# Patient Record
Sex: MALE | Race: BLACK OR AFRICAN AMERICAN | Employment: UNEMPLOYED | URBAN - METROPOLITAN AREA
[De-identification: names, ages, dates, MRNs, and addresses within clinical notes are randomized per-mention and may not be internally consistent; named-entity substitution may affect disease eponyms.]

---

## 2017-06-21 ENCOUNTER — APPOINTMENT (OUTPATIENT)
Dept: GENERAL RADIOLOGY | Age: 62
End: 2017-06-21
Attending: EMERGENCY MEDICINE
Payer: MEDICARE

## 2017-06-21 ENCOUNTER — HOSPITAL ENCOUNTER (EMERGENCY)
Age: 62
Discharge: HOME | End: 2017-06-21
Attending: EMERGENCY MEDICINE
Payer: MEDICARE

## 2017-06-21 VITALS
SYSTOLIC BLOOD PRESSURE: 165 MMHG | OXYGEN SATURATION: 98 % | HEART RATE: 78 BPM | HEIGHT: 77 IN | WEIGHT: 173.28 LBS | DIASTOLIC BLOOD PRESSURE: 89 MMHG | TEMPERATURE: 98.3 F | RESPIRATION RATE: 15 BRPM | BODY MASS INDEX: 20.46 KG/M2

## 2017-06-21 DIAGNOSIS — R07.89 ATYPICAL CHEST PAIN: Primary | ICD-10-CM

## 2017-06-21 LAB
ALBUMIN SERPL BCP-MCNC: 3.5 G/DL (ref 3.5–5)
ALBUMIN/GLOB SERPL: 0.8 {RATIO} (ref 1.1–2.2)
ALP SERPL-CCNC: 104 U/L (ref 45–117)
ALT SERPL-CCNC: 13 U/L (ref 12–78)
ANION GAP BLD CALC-SCNC: 9 MMOL/L (ref 5–15)
AST SERPL W P-5'-P-CCNC: 18 U/L (ref 15–37)
ATRIAL RATE: 66 BPM
BASOPHILS # BLD AUTO: 0.1 K/UL (ref 0–0.1)
BASOPHILS # BLD: 1 % (ref 0–1)
BILIRUB SERPL-MCNC: 0.5 MG/DL (ref 0.2–1)
BUN SERPL-MCNC: 32 MG/DL (ref 6–20)
BUN/CREAT SERPL: 4 (ref 12–20)
CALCIUM SERPL-MCNC: 8.9 MG/DL (ref 8.5–10.1)
CALCULATED P AXIS, ECG09: 74 DEGREES
CALCULATED R AXIS, ECG10: 57 DEGREES
CALCULATED T AXIS, ECG11: 80 DEGREES
CHLORIDE SERPL-SCNC: 99 MMOL/L (ref 97–108)
CO2 SERPL-SCNC: 29 MMOL/L (ref 21–32)
CREAT SERPL-MCNC: 8.23 MG/DL (ref 0.7–1.3)
D DIMER PPP FEU-MCNC: 0.37 MG/L FEU (ref 0–0.65)
DIAGNOSIS, 93000: NORMAL
EOSINOPHIL # BLD: 0.1 K/UL (ref 0–0.4)
EOSINOPHIL NFR BLD: 2 % (ref 0–7)
ERYTHROCYTE [DISTWIDTH] IN BLOOD BY AUTOMATED COUNT: 18.1 % (ref 11.5–14.5)
GLOBULIN SER CALC-MCNC: 4.3 G/DL (ref 2–4)
GLUCOSE SERPL-MCNC: 76 MG/DL (ref 65–100)
HCT VFR BLD AUTO: 37.3 % (ref 36.6–50.3)
HGB BLD-MCNC: 11.5 G/DL (ref 12.1–17)
LYMPHOCYTES # BLD AUTO: 26 % (ref 12–49)
LYMPHOCYTES # BLD: 1.2 K/UL (ref 0.8–3.5)
MCH RBC QN AUTO: 28 PG (ref 26–34)
MCHC RBC AUTO-ENTMCNC: 30.8 G/DL (ref 30–36.5)
MCV RBC AUTO: 90.8 FL (ref 80–99)
MONOCYTES # BLD: 0.6 K/UL (ref 0–1)
MONOCYTES NFR BLD AUTO: 13 % (ref 5–13)
NEUTS SEG # BLD: 2.7 K/UL (ref 1.8–8)
NEUTS SEG NFR BLD AUTO: 58 % (ref 32–75)
P-R INTERVAL, ECG05: 154 MS
PLATELET # BLD AUTO: 190 K/UL (ref 150–400)
POTASSIUM SERPL-SCNC: 2.9 MMOL/L (ref 3.5–5.1)
PROT SERPL-MCNC: 7.8 G/DL (ref 6.4–8.2)
Q-T INTERVAL, ECG07: 420 MS
QRS DURATION, ECG06: 100 MS
QTC CALCULATION (BEZET), ECG08: 440 MS
RBC # BLD AUTO: 4.11 M/UL (ref 4.1–5.7)
SODIUM SERPL-SCNC: 137 MMOL/L (ref 136–145)
TROPONIN I SERPL-MCNC: <0.04 NG/ML
VENTRICULAR RATE, ECG03: 66 BPM
WBC # BLD AUTO: 4.7 K/UL (ref 4.1–11.1)

## 2017-06-21 PROCEDURE — 99283 EMERGENCY DEPT VISIT LOW MDM: CPT

## 2017-06-21 PROCEDURE — 84484 ASSAY OF TROPONIN QUANT: CPT | Performed by: EMERGENCY MEDICINE

## 2017-06-21 PROCEDURE — 93005 ELECTROCARDIOGRAM TRACING: CPT

## 2017-06-21 PROCEDURE — 80053 COMPREHEN METABOLIC PANEL: CPT | Performed by: EMERGENCY MEDICINE

## 2017-06-21 PROCEDURE — 36415 COLL VENOUS BLD VENIPUNCTURE: CPT | Performed by: EMERGENCY MEDICINE

## 2017-06-21 PROCEDURE — 85025 COMPLETE CBC W/AUTO DIFF WBC: CPT | Performed by: EMERGENCY MEDICINE

## 2017-06-21 PROCEDURE — 71010 XR CHEST PORT: CPT

## 2017-06-21 PROCEDURE — 85379 FIBRIN DEGRADATION QUANT: CPT | Performed by: EMERGENCY MEDICINE

## 2017-06-21 NOTE — DISCHARGE INSTRUCTIONS
You were seen here in the emergency department for a week-long concern for chest pain. Your evaluation here including an exam, blood work, ekg and chest xray were reassuring for any acute emergency. As we discussed, it is strongly recommended that you follow-up with a cardiologist for re-evaluation. Return for new chest pain, difficulty breathing, if you develop sudden onset weakness in your face, arms or legs. Chest Pain: Care Instructions  Your Care Instructions  There are many things that can cause chest pain. Some are not serious and will get better on their own in a few days. But some kinds of chest pain need more testing and treatment. Your doctor may have recommended a follow-up visit in the next 8 to 12 hours. If you are not getting better, you may need more tests or treatment. Even though your doctor has released you, you still need to watch for any problems. The doctor carefully checked you, but sometimes problems can develop later. If you have new symptoms or if your symptoms do not get better, get medical care right away. If you have worse or different chest pain or pressure that lasts more than 5 minutes or you passed out (lost consciousness), call 911 or seek other emergency help right away. A medical visit is only one step in your treatment. Even if you feel better, you still need to do what your doctor recommends, such as going to all suggested follow-up appointments and taking medicines exactly as directed. This will help you recover and help prevent future problems. How can you care for yourself at home? · Rest until you feel better. · Take your medicine exactly as prescribed. Call your doctor if you think you are having a problem with your medicine. · Do not drive after taking a prescription pain medicine. When should you call for help? Call 911 if:  · You passed out (lost consciousness). · You have severe difficulty breathing. · You have symptoms of a heart attack.  These may include:  ¨ Chest pain or pressure, or a strange feeling in your chest.  ¨ Sweating. ¨ Shortness of breath. ¨ Nausea or vomiting. ¨ Pain, pressure, or a strange feeling in your back, neck, jaw, or upper belly or in one or both shoulders or arms. ¨ Lightheadedness or sudden weakness. ¨ A fast or irregular heartbeat. After you call 911, the  may tell you to chew 1 adult-strength or 2 to 4 low-dose aspirin. Wait for an ambulance. Do not try to drive yourself. Call your doctor today if:  · You have any trouble breathing. · Your chest pain gets worse. · You are dizzy or lightheaded, or you feel like you may faint. · You are not getting better as expected. · You are having new or different chest pain. Where can you learn more? Go to http://candace-celestina.info/. Enter A120 in the search box to learn more about \"Chest Pain: Care Instructions. \"  Current as of: March 20, 2017  Content Version: 11.3  © 2957-6378 [employer]. Care instructions adapted under license by [employer] (which disclaims liability or warranty for this information). If you have questions about a medical condition or this instruction, always ask your healthcare professional. [place] [address] any warranty or liability for your use of this information.

## 2017-06-21 NOTE — ED PROVIDER NOTES
HPI Comments: Evonnie Osler, 57 y.o. Male with h/o HTN and on home hemodialysis, presents ambulatory to [facility] ED with cc of 1 week of constant, non-radiating and non-exertional chest pain. He states his chest pain worsened today and currently rates his pain as 7/10 in severity, described as tightness, and with an associated pleuritic component. He also c/o slight shortness of breath, decreased appetite, and breaking into a cold sweat this morning. He reports no known alleviating factors of his sxs. Patient specifically denies nausea, leg swelling, calf pain, travel, fever, night sweats, weight loss, or hemoptysis. His pmhx is not significant for CAD, MI, HF, HLD, DM, DVT/PE. His fhx is also not significant for DVT/PE or MI. Patient states he receives yearly stress tests at [facility] and notes that his last stress test was not completed. PCP: Sera Davila MD    PMHx significant for: HTN, ESRF on dialysis, HLD   PSHx significant for: L AV fistula, R kidney transplant and nephrectomy   Social history significant for: - Tobacco (former), - EtOH, + Illicit Drug Use (OTC/prescription)    There are no other complaints, changes, or physical findings at this time. Written by VEENA Florenceibofelia, as dictated by Bailey Capone MD.     The history is provided by the patient. No  was used. Past Medical History:   Diagnosis Date    Chronic kidney disease     dialysis    End stage renal failure on dialysis ([address]     on home dialysis    Hypercholesterolemia     Hypertension        Past Surgical History:   Procedure Laterality Date    HX TRANSPLANT      right kidney transplant then removal     HX VASCULAR ACCESS      left forearm shunt    HX VASCULAR ACCESS  4/22/14    REVISION AV FISTULA LEFT ARM WITH REPAIR OF PSEUDOANEURYSM         No family history on file.     Social History     Social History    Marital status:      Spouse name: N/A    Number of children: N/A    Years of education: N/A     Occupational History    Not on file. Social History Main Topics    Smoking status: Never Smoker    Smokeless tobacco: Not on file    Alcohol use No    Drug use: Yes     Special: Prescription, OTC    Sexual activity: Not on file     Other Topics Concern    Not on file     Social History Narrative    No narrative on file         ALLERGIES: Codeine; Metoprolol; Morphine; and Pcn [penicillins]    Review of Systems   Constitutional: Positive for appetite change and diaphoresis (cold sweat). Negative for fever and unexpected weight change. Negative for night sweats   Respiratory: Positive for shortness of breath. Negative for hemoptysis    Cardiovascular: Positive for chest pain. Negative for leg swelling. Gastrointestinal: Negative for nausea. Musculoskeletal: Negative for myalgias. All other systems reviewed and are negative. Vitals:    06/21/17 1312 06/21/17 1553 06/21/17 1638   BP: 148/84 116/67 165/89   Pulse: 71 72 78   Resp: 16 16 15   Temp: 97.6 °F (36.4 °C)  98.3 °F (36.8 °C)   SpO2: 100% 100% 98%   Weight: 78.6 kg (173 lb 4.5 oz)     Height: 6' 5\" (1.956 m)              Physical Exam   Vital signs and nursing notes reviewed    CONSTITUTIONAL: Alert, in no apparent distress; well-developed; well-nourished. Tall, thin build. HEAD:  Normocephalic, atraumatic  EYES: PERRL; EOM's intact. ENTM: Nose: no rhinorrhea; Throat: no erythema or exudate, mucous membranes moist  Neck:  Supple. trachea is midline. RESP: Chest clear, equal breath sounds. - W/R/R  CV: S1 and S2 WNL; No murmurs, gallops or rubs. 2+ radial and DP pulses bilaterally. GI: non-distended, normal bowel sounds, abdomen soft and non-tender. No masses or organomegaly. : No costo-vertebral angle tenderness. BACK:  Non-tender, normal appearance  UPPER EXT:  Normal inspection. no joint or soft tissue swelling  LOWER EXT: No edema, no calf tenderness.   NEURO: Alert and oriented x3, 5/5 strength and light touch sensation intact in bilateral upper and lower extremities. SKIN: No rashes; Warm and dry  PSYCH: Normal mood, normal affect  Written by VEENA Dang, as dictated by Venita Qiu MD.      MDM  Number of Diagnoses or Management Options  Atypical chest pain:   Diagnosis management comments:   57 M with week-long chest pain with some pleuritic component w/o evidence of external trauma. Initial ekg reassuring for ischemia. Will screen with one Tn, dash. Ekg does not suggest pericarditis. XR does not show PTX or widened mediastinum. If labs reassuring, will d/c with outpatient cardiology f/u. will provide local cardiologist referral though [place] [facility] [place] Amount and/or Complexity of Data Reviewed  Clinical lab tests: ordered and reviewed  Tests in the radiology section of CPT®: reviewed and ordered  Tests in the medicine section of CPT®: reviewed and ordered  Review and summarize past medical records: yes  Independent visualization of images, tracings, or specimens: yes    Patient Progress  Patient progress: stable    ED Course       Procedures    EKG interpretation: (Preliminary) 13:14  SR at 66 BPM. nml axis. nml intervals. No acute ischemic changes. Written by VEENA Dang, as dictated by Venita Qiu MD.     Progress Note:  3:25 PM  Spoke with lab, states pt's specimens for CMP and Tn have hemolyzed. RN states phlebotomist is in the pt's room currently re-drawing specimens.   Written by VEENA Dang, as dictated by Venita Qiu MD.        LABORATORY TESTS:  Recent Results (from the past 12 hour(s))   EKG, 12 LEAD, INITIAL    Collection Time: 06/21/17  1:14 PM   Result Value Ref Range    Ventricular Rate 66 BPM    Atrial Rate 66 BPM    P-R Interval 154 ms    QRS Duration 100 ms    Q-T Interval 420 ms    QTC Calculation (Bezet) 440 ms    Calculated P Axis 74 degrees    Calculated R Axis 57 degrees    Calculated T Axis 80 degrees Diagnosis       Normal sinus rhythm  Normal ECG  When compared with ECG of 22-APR-2014 09:33,  Criteria for Anterior infarct are no longer present     CBC WITH AUTOMATED DIFF    Collection Time: 06/21/17  1:44 PM   Result Value Ref Range    WBC 4.7 4.1 - 11.1 K/uL    RBC 4.11 4.10 - 5.70 M/uL    HGB 11.5 (L) 12.1 - 17.0 g/dL    HCT 37.3 36.6 - 50.3 %    MCV 90.8 80.0 - 99.0 FL    MCH 28.0 26.0 - 34.0 PG    MCHC 30.8 30.0 - 36.5 g/dL    RDW 18.1 (H) 11.5 - 14.5 %    PLATELET 843 453 - 513 K/uL    NEUTROPHILS 58 32 - 75 %    LYMPHOCYTES 26 12 - 49 %    MONOCYTES 13 5 - 13 %    EOSINOPHILS 2 0 - 7 %    BASOPHILS 1 0 - 1 %    ABS. NEUTROPHILS 2.7 1.8 - 8.0 K/UL    ABS. LYMPHOCYTES 1.2 0.8 - 3.5 K/UL    ABS. MONOCYTES 0.6 0.0 - 1.0 K/UL    ABS. EOSINOPHILS 0.1 0.0 - 0.4 K/UL    ABS. BASOPHILS 0.1 0.0 - 0.1 K/UL   D DIMER    Collection Time: 06/21/17  1:44 PM   Result Value Ref Range    D-dimer 0.37 0.00 - 0.65 mg/L [facility]   METABOLIC PANEL, COMPREHENSIVE    Collection Time: 06/21/17  3:22 PM   Result Value Ref Range    Sodium 137 136 - 145 mmol/L    Potassium 2.9 (L) 3.5 - 5.1 mmol/L    Chloride 99 97 - 108 mmol/L    CO2 29 21 - 32 mmol/L    Anion gap 9 5 - 15 mmol/L    Glucose 76 65 - 100 mg/dL    BUN 32 (H) 6 - 20 MG/DL    Creatinine 8.23 (H) 0.70 - 1.30 MG/DL    BUN/Creatinine ratio 4 (L) 12 - 20      GFR est AA 8 (L) >60 ml/min/1.73m2    GFR est non-AA 7 (L) >60 ml/min/1.73m2    Calcium 8.9 8.5 - 10.1 MG/DL    Bilirubin, total 0.5 0.2 - 1.0 MG/DL    ALT (SGPT) 13 12 - 78 U/L    AST (SGOT) 18 15 - 37 U/L    Alk.  phosphatase 104 45 - 117 U/L    Protein, total 7.8 6.4 - 8.2 g/dL    Albumin 3.5 3.5 - 5.0 g/dL    Globulin 4.3 (H) 2.0 - 4.0 g/dL    A-G Ratio 0.8 (L) 1.1 - 2.2     TROPONIN I    Collection Time: 06/21/17  3:22 PM   Result Value Ref Range    Troponin-I, Qt. <0.04 <0.05 ng/mL       IMAGING RESULTS:  CXR Results  (Last 48 hours)               06/21/17 1408  XR CHEST PORT Final result    Impression:  IMPRESSION: No acute process on portable chest. No change. Narrative:  EXAM:  XR CHEST PORT       INDICATION:  Intermittent chest pain and tightness for one week is increased   today. COMPARISON: Chest views on 6/19/2013       TECHNIQUE: 2 images of upright portable chest AP view       FINDINGS: The cardiomediastinal and hilar contours are within normal limits. The   pulmonary vasculature is within normal limits. Aortic arch is atherosclerotic. Lungs and pleural spaces are clear. Bones are   unchanged. IMPRESSION:  1. Atypical chest pain        PLAN:  1. Discharge Medication List as of 6/21/2017  2:51 PM        2. Follow-up Information     Follow up With Details Comments [address], [place] In 1 week for re-evaluation of your chest pain [address]  [id number]  [phone]        Please call the [address] [place] [address] for a follow-up appointment with a cardiologist.         Return to ED if worse     Discharge Note:  4:39 PM  The pt is ready for discharge. The pt's signs, symptoms, diagnosis, and discharge instructions have been discussed and pt has conveyed their understanding. The pt is to follow up as recommended or return to ER should their symptoms worsen. Plan has been discussed and pt is in agreement. This note is prepared by Meaghan Molina, acting as a Scribe for Portia Lopez MD.    Portia Lopez MD: The scribe's documentation has been prepared under my direction and personally reviewed by me in its entirety. I confirm that the notes above accurately reflects all work, treatment, procedures, and medical decision making performed by me.

## 2017-06-21 NOTE — ED NOTES
Ed provider discussed discharge with the patient and all questioned fully answered. VSS. LDA removed.